# Patient Record
Sex: FEMALE | Race: WHITE | NOT HISPANIC OR LATINO
[De-identification: names, ages, dates, MRNs, and addresses within clinical notes are randomized per-mention and may not be internally consistent; named-entity substitution may affect disease eponyms.]

---

## 2017-10-11 PROBLEM — Z00.00 ENCOUNTER FOR PREVENTIVE HEALTH EXAMINATION: Status: ACTIVE | Noted: 2017-10-11

## 2017-10-12 ENCOUNTER — TRANSCRIPTION ENCOUNTER (OUTPATIENT)
Age: 62
End: 2017-10-12

## 2017-10-24 ENCOUNTER — APPOINTMENT (OUTPATIENT)
Dept: CARDIOLOGY | Facility: CLINIC | Age: 62
End: 2017-10-24
Payer: COMMERCIAL

## 2017-10-24 ENCOUNTER — NON-APPOINTMENT (OUTPATIENT)
Age: 62
End: 2017-10-24

## 2017-10-24 VITALS — HEART RATE: 79 BPM | DIASTOLIC BLOOD PRESSURE: 80 MMHG | WEIGHT: 172 LBS | SYSTOLIC BLOOD PRESSURE: 170 MMHG

## 2017-10-24 DIAGNOSIS — Z82.49 FAMILY HISTORY OF ISCHEMIC HEART DISEASE AND OTHER DISEASES OF THE CIRCULATORY SYSTEM: ICD-10-CM

## 2017-10-24 DIAGNOSIS — R01.1 CARDIAC MURMUR, UNSPECIFIED: ICD-10-CM

## 2017-10-24 DIAGNOSIS — Z83.3 FAMILY HISTORY OF DIABETES MELLITUS: ICD-10-CM

## 2017-10-24 DIAGNOSIS — R00.2 PALPITATIONS: ICD-10-CM

## 2017-10-24 DIAGNOSIS — K21.9 GASTRO-ESOPHAGEAL REFLUX DISEASE W/OUT ESOPHAGITIS: ICD-10-CM

## 2017-10-24 DIAGNOSIS — Z83.511 FAMILY HISTORY OF GLAUCOMA: ICD-10-CM

## 2017-10-24 DIAGNOSIS — Z80.0 FAMILY HISTORY OF MALIGNANT NEOPLASM OF DIGESTIVE ORGANS: ICD-10-CM

## 2017-10-24 PROCEDURE — 93040 RHYTHM ECG WITH REPORT: CPT | Mod: 59

## 2017-10-24 PROCEDURE — 93306 TTE W/DOPPLER COMPLETE: CPT

## 2017-10-24 PROCEDURE — 99204 OFFICE O/P NEW MOD 45 MIN: CPT | Mod: 25

## 2017-10-24 PROCEDURE — 93000 ELECTROCARDIOGRAM COMPLETE: CPT

## 2017-10-26 ENCOUNTER — RESULT CHARGE (OUTPATIENT)
Age: 62
End: 2017-10-26

## 2017-10-27 PROBLEM — R01.1 HEART MURMUR: Status: ACTIVE | Noted: 2017-10-27

## 2017-10-27 PROBLEM — K21.9 GERD (GASTROESOPHAGEAL REFLUX DISEASE): Status: ACTIVE | Noted: 2017-10-27

## 2017-10-27 PROBLEM — R00.2 PALPITATIONS: Status: ACTIVE | Noted: 2017-10-24

## 2017-10-27 PROBLEM — K21.9 LARYNGOPHARYNGEAL REFLUX (LPR): Status: ACTIVE | Noted: 2017-10-27

## 2017-10-28 VITALS — DIASTOLIC BLOOD PRESSURE: 80 MMHG | SYSTOLIC BLOOD PRESSURE: 182 MMHG

## 2017-10-28 PROBLEM — Z82.49 FAMILY HISTORY OF HYPERTENSION: Status: ACTIVE | Noted: 2017-10-28

## 2017-10-28 PROBLEM — Z83.511 FAMILY HISTORY OF GLAUCOMA: Status: ACTIVE | Noted: 2017-10-28

## 2017-10-28 PROBLEM — Z80.0 FAMILY HISTORY OF MALIGNANT NEOPLASM OF STOMACH: Status: ACTIVE | Noted: 2017-10-28

## 2017-10-28 PROBLEM — Z82.49 FAMILY HISTORY OF ACUTE MYOCARDIAL INFARCTION: Status: ACTIVE | Noted: 2017-10-28

## 2017-10-28 PROBLEM — Z82.49 FAMILY HISTORY OF CORONARY ARTERY DISEASE: Status: ACTIVE | Noted: 2017-10-28

## 2017-10-28 PROBLEM — Z83.3 FAMILY HISTORY OF DIABETES MELLITUS: Status: ACTIVE | Noted: 2017-10-28

## 2017-10-28 RX ORDER — LEVOTHYROXINE SODIUM 100 UG/1
100 TABLET ORAL DAILY
Refills: 0 | Status: ACTIVE | COMMUNITY

## 2017-10-28 RX ORDER — VALSARTAN 160 MG/1
160 TABLET, COATED ORAL DAILY
Refills: 0 | Status: ACTIVE | COMMUNITY

## 2017-10-28 RX ORDER — OMEPRAZOLE MAGNESIUM 20 MG/1
20 TABLET, DELAYED RELEASE ORAL DAILY
Refills: 0 | Status: ACTIVE | COMMUNITY

## 2017-10-29 ENCOUNTER — NON-APPOINTMENT (OUTPATIENT)
Age: 62
End: 2017-10-29

## 2017-12-28 ENCOUNTER — APPOINTMENT (OUTPATIENT)
Dept: CARDIOLOGY | Facility: CLINIC | Age: 62
End: 2017-12-28
Payer: COMMERCIAL

## 2017-12-28 DIAGNOSIS — R06.00 DYSPNEA, UNSPECIFIED: ICD-10-CM

## 2017-12-28 DIAGNOSIS — I47.1 SUPRAVENTRICULAR TACHYCARDIA: ICD-10-CM

## 2017-12-28 DIAGNOSIS — R94.39 ABNORMAL RESULT OF OTHER CARDIOVASCULAR FUNCTION STUDY: ICD-10-CM

## 2017-12-28 PROCEDURE — 99214 OFFICE O/P EST MOD 30 MIN: CPT | Mod: 25

## 2017-12-28 PROCEDURE — 93351 STRESS TTE COMPLETE: CPT

## 2018-01-03 ENCOUNTER — APPOINTMENT (OUTPATIENT)
Dept: CARDIOLOGY | Facility: CLINIC | Age: 63
End: 2018-01-03
Payer: COMMERCIAL

## 2018-01-03 PROCEDURE — 93224 XTRNL ECG REC UP TO 48 HRS: CPT

## 2018-01-09 ENCOUNTER — NON-APPOINTMENT (OUTPATIENT)
Age: 63
End: 2018-01-09

## 2018-01-18 ENCOUNTER — NON-APPOINTMENT (OUTPATIENT)
Age: 63
End: 2018-01-18

## 2018-07-09 ENCOUNTER — RESULT REVIEW (OUTPATIENT)
Age: 63
End: 2018-07-09

## 2019-05-19 ENCOUNTER — TRANSCRIPTION ENCOUNTER (OUTPATIENT)
Age: 64
End: 2019-05-19

## 2019-08-05 ENCOUNTER — RESULT REVIEW (OUTPATIENT)
Age: 64
End: 2019-08-05

## 2021-07-04 ENCOUNTER — EMERGENCY (EMERGENCY)
Facility: HOSPITAL | Age: 66
LOS: 1 days | Discharge: HOME | End: 2021-07-04
Attending: EMERGENCY MEDICINE
Payer: MEDICARE

## 2021-07-04 VITALS
HEART RATE: 84 BPM | SYSTOLIC BLOOD PRESSURE: 163 MMHG | OXYGEN SATURATION: 98 % | DIASTOLIC BLOOD PRESSURE: 84 MMHG | TEMPERATURE: 99 F | RESPIRATION RATE: 18 BRPM

## 2021-07-04 VITALS
OXYGEN SATURATION: 99 % | SYSTOLIC BLOOD PRESSURE: 149 MMHG | DIASTOLIC BLOOD PRESSURE: 83 MMHG | RESPIRATION RATE: 18 BRPM | WEIGHT: 166.89 LBS | HEART RATE: 79 BPM | HEIGHT: 67 IN | TEMPERATURE: 99 F

## 2021-07-04 DIAGNOSIS — E89.0 POSTPROCEDURAL HYPOTHYROIDISM: Chronic | ICD-10-CM

## 2021-07-04 LAB
ALBUMIN SERPL ELPH-MCNC: 4.4 G/DL — SIGNIFICANT CHANGE UP (ref 3.3–5)
ALP SERPL-CCNC: 73 U/L — SIGNIFICANT CHANGE UP (ref 40–120)
ALT FLD-CCNC: 16 U/L — SIGNIFICANT CHANGE UP (ref 10–45)
ANION GAP SERPL CALC-SCNC: 14 MMOL/L — SIGNIFICANT CHANGE UP (ref 5–17)
AST SERPL-CCNC: 21 U/L — SIGNIFICANT CHANGE UP (ref 10–40)
BASOPHILS # BLD AUTO: 0.04 K/UL — SIGNIFICANT CHANGE UP (ref 0–0.2)
BASOPHILS NFR BLD AUTO: 0.3 % — SIGNIFICANT CHANGE UP (ref 0–2)
BILIRUB SERPL-MCNC: 0.3 MG/DL — SIGNIFICANT CHANGE UP (ref 0.2–1.2)
BUN SERPL-MCNC: 10 MG/DL — SIGNIFICANT CHANGE UP (ref 7–23)
CALCIUM SERPL-MCNC: 10.1 MG/DL — SIGNIFICANT CHANGE UP (ref 8.4–10.5)
CHLORIDE SERPL-SCNC: 96 MMOL/L — SIGNIFICANT CHANGE UP (ref 96–108)
CO2 SERPL-SCNC: 23 MMOL/L — SIGNIFICANT CHANGE UP (ref 22–31)
CREAT SERPL-MCNC: 0.75 MG/DL — SIGNIFICANT CHANGE UP (ref 0.5–1.3)
EOSINOPHIL # BLD AUTO: 0.01 K/UL — SIGNIFICANT CHANGE UP (ref 0–0.5)
EOSINOPHIL NFR BLD AUTO: 0.1 % — SIGNIFICANT CHANGE UP (ref 0–6)
GLUCOSE SERPL-MCNC: 129 MG/DL — HIGH (ref 70–99)
HCT VFR BLD CALC: 42.1 % — SIGNIFICANT CHANGE UP (ref 34.5–45)
HGB BLD-MCNC: 14 G/DL — SIGNIFICANT CHANGE UP (ref 11.5–15.5)
IMM GRANULOCYTES NFR BLD AUTO: 0.5 % — SIGNIFICANT CHANGE UP (ref 0–1.5)
LIDOCAIN IGE QN: 27 U/L — SIGNIFICANT CHANGE UP (ref 7–60)
LYMPHOCYTES # BLD AUTO: 1.18 K/UL — SIGNIFICANT CHANGE UP (ref 1–3.3)
LYMPHOCYTES # BLD AUTO: 9.7 % — LOW (ref 13–44)
MCHC RBC-ENTMCNC: 30.2 PG — SIGNIFICANT CHANGE UP (ref 27–34)
MCHC RBC-ENTMCNC: 33.3 GM/DL — SIGNIFICANT CHANGE UP (ref 32–36)
MCV RBC AUTO: 90.9 FL — SIGNIFICANT CHANGE UP (ref 80–100)
MONOCYTES # BLD AUTO: 0.44 K/UL — SIGNIFICANT CHANGE UP (ref 0–0.9)
MONOCYTES NFR BLD AUTO: 3.6 % — SIGNIFICANT CHANGE UP (ref 2–14)
NEUTROPHILS # BLD AUTO: 10.39 K/UL — HIGH (ref 1.8–7.4)
NEUTROPHILS NFR BLD AUTO: 85.8 % — HIGH (ref 43–77)
NRBC # BLD: 0 /100 WBCS — SIGNIFICANT CHANGE UP (ref 0–0)
PLATELET # BLD AUTO: 308 K/UL — SIGNIFICANT CHANGE UP (ref 150–400)
POTASSIUM SERPL-MCNC: 3.9 MMOL/L — SIGNIFICANT CHANGE UP (ref 3.5–5.3)
POTASSIUM SERPL-SCNC: 3.9 MMOL/L — SIGNIFICANT CHANGE UP (ref 3.5–5.3)
PROT SERPL-MCNC: 7.7 G/DL — SIGNIFICANT CHANGE UP (ref 6–8.3)
RBC # BLD: 4.63 M/UL — SIGNIFICANT CHANGE UP (ref 3.8–5.2)
RBC # FLD: 12.8 % — SIGNIFICANT CHANGE UP (ref 10.3–14.5)
SODIUM SERPL-SCNC: 133 MMOL/L — LOW (ref 135–145)
TROPONIN T, HIGH SENSITIVITY RESULT: <6 NG/L — SIGNIFICANT CHANGE UP (ref 0–51)
WBC # BLD: 12.12 K/UL — HIGH (ref 3.8–10.5)
WBC # FLD AUTO: 12.12 K/UL — HIGH (ref 3.8–10.5)

## 2021-07-04 PROCEDURE — 71046 X-RAY EXAM CHEST 2 VIEWS: CPT | Mod: 26

## 2021-07-04 PROCEDURE — 84484 ASSAY OF TROPONIN QUANT: CPT

## 2021-07-04 PROCEDURE — 71046 X-RAY EXAM CHEST 2 VIEWS: CPT

## 2021-07-04 PROCEDURE — 80053 COMPREHEN METABOLIC PANEL: CPT

## 2021-07-04 PROCEDURE — 99284 EMERGENCY DEPT VISIT MOD MDM: CPT | Mod: 25

## 2021-07-04 PROCEDURE — 93005 ELECTROCARDIOGRAM TRACING: CPT

## 2021-07-04 PROCEDURE — 99285 EMERGENCY DEPT VISIT HI MDM: CPT | Mod: GC

## 2021-07-04 PROCEDURE — 83690 ASSAY OF LIPASE: CPT

## 2021-07-04 PROCEDURE — 76705 ECHO EXAM OF ABDOMEN: CPT | Mod: 26,RT

## 2021-07-04 PROCEDURE — 76705 ECHO EXAM OF ABDOMEN: CPT

## 2021-07-04 PROCEDURE — 96374 THER/PROPH/DIAG INJ IV PUSH: CPT

## 2021-07-04 PROCEDURE — 85025 COMPLETE CBC W/AUTO DIFF WBC: CPT

## 2021-07-04 PROCEDURE — 93010 ELECTROCARDIOGRAM REPORT: CPT | Mod: GC

## 2021-07-04 RX ORDER — SUCRALFATE 1 G
1 TABLET ORAL ONCE
Refills: 0 | Status: COMPLETED | OUTPATIENT
Start: 2021-07-04 | End: 2021-07-04

## 2021-07-04 RX ORDER — FAMOTIDINE 10 MG/ML
20 INJECTION INTRAVENOUS ONCE
Refills: 0 | Status: COMPLETED | OUTPATIENT
Start: 2021-07-04 | End: 2021-07-04

## 2021-07-04 RX ORDER — ACETAMINOPHEN 500 MG
975 TABLET ORAL ONCE
Refills: 0 | Status: COMPLETED | OUTPATIENT
Start: 2021-07-04 | End: 2021-07-04

## 2021-07-04 RX ADMIN — Medication 1 GRAM(S): at 14:03

## 2021-07-04 RX ADMIN — Medication 975 MILLIGRAM(S): at 14:02

## 2021-07-04 RX ADMIN — FAMOTIDINE 20 MILLIGRAM(S): 10 INJECTION INTRAVENOUS at 14:03

## 2021-07-04 RX ADMIN — Medication 30 MILLILITER(S): at 14:02

## 2021-07-04 NOTE — ED PROVIDER NOTE - PHYSICAL EXAMINATION
GENERAL: elderly female, lying in bed, NAD. Vital signs are within normal limits  EYES: Conjunctiva noninjected or pale, sclera anicteric  HENT: NC/AT, moist mucous membranes  NECK: Supple, trachea midline  LUNG: Nonlabored respirations, no wheezes, rales  CV: RRR, no murmurs, Pulses- Radial/dorsalis pedis: 2+ bilateral and equal  ABDOMEN: Nondistended, + epigastric ttp, - collier, no rebound  MSK: No visible deformities, nontender extremities, no LE edema  SKIN: No rashes, bruises  NEURO: AAOx4 (to person, place, time, event), no tremor, steady gait  PSYCH: Normal mood and affect

## 2021-07-04 NOTE — ED ADULT NURSE NOTE - NS ED NURSE RECORD ANOTHER HT AND WT
Subjective   Patient ID: Deanna is a 30 year old female who presents today for prenatal visit.  She is of 18w0d gestation.  OB History    Para Term  AB Living   1 0 0 0 0 0   SAB TAB Ectopic Molar Multiple Live Births   0 0 0 0 0 0     No fetal movement,    denies any problem  ASSESSMENT:  Pregnancy at 18w0d weeks gestation.  Rhogam candidate.    PLAN:  Follow up visit in 4 weeks   us for anatomy 2 weeks.   declines mss4.   flu vaccine today.   Yes

## 2021-07-04 NOTE — ED ADULT NURSE NOTE - ISOLATION TYPE:
[facility] CARE COORDINATION  [address] [facility] [address]  Phone: [phone]      July 19, 2021      Kristine Scott  [address] [place] [id number]    Dear Kristine,    We have been trying to reach you as you are currently enrolled in [facility] s Care Coordination program.  The goal of care coordination is to help you manage your health and improve access to the [facility] system in the most efficient manner.  The Care Coordination team understands the healthcare system and will assist you in improving your access to care.     We will continue to reach out once more; however, if you are able to call your Care Coordinator that would be appreciated.      We at [facility] are focused on providing you with the highest-quality healthcare experience possible.  It is a pleasure to partner with you as we work towards achieving your optimal state of wellness.        Sincerely,       TIFF Rebollar  Social Work Care Coordinator  [facility]  [email or web address]  [email or web address]    Phone: [phone]      Isadora REYES  Community Health Worker  [facility]  Clinic Care Coordination  [facility]  [email or web address]  [email or web address]   Office: [phone]       None

## 2021-07-04 NOTE — ED PROVIDER NOTE - PATIENT PORTAL LINK FT
You can access the FollowMyHealth Patient Portal offered by [facility] by registering at the following website: [email or web address]. By joining [employer]’s FollowMyHealth portal, you will also be able to view your health information using other applications (apps) compatible with our system.

## 2021-07-04 NOTE — ED PROVIDER NOTE - PROGRESS NOTE DETAILS
Onel Rdz D.O., PGY3 (Resident)  Patient feels improved. Tolerated PO. Results endorsed including gallstones and hepatic steatosis. Return precautions given. Patient expressed verbal understanding.  Will DC with outpatient follow-up. Attending MD Bailon: Patient re-evaluated and feeling improved.  No acute issues at  this time.  Lab and radiology tests reviewed with patient and family.  Tolerating po, eager for discharge.  Patient stable for discharge. Follow up instructions given, importance of follow up emphasized, return to ED parameters reviewed and patient verbalized understanding.  All questions answered, all concerns addressed.

## 2021-07-04 NOTE — ED PROVIDER NOTE - NSFOLLOWUPCLINICS_GEN_ALL_ED_FT
[facility] Specialty Clinics  General Surgery  [address] [place] [address] - 3rd [place] [address]  Phone: [phone]  Fax:   Follow Up Time: 4-6 Days

## 2021-07-04 NOTE — ED ADULT NURSE NOTE - OBJECTIVE STATEMENT
66y old female PMH thyroidectomy and HTN walk in from triage epigastric pain. A&Ox3. Patient states this morning she woke up with epigastric pain w/ radiation under right breast and axilla are, she states it feels like burning and "sticking" pain. She denies chest pain, sob, ha, n/v/d, f/c, urinary symptoms, hematuria. Patient is well appearing, abdomen soft, non-distended, non-tender, bowel sounds present in all 4 quadrants, no pain upon palpation. IV inserted call bell within reach.

## 2021-07-04 NOTE — ED PROVIDER NOTE - ATTENDING CONTRIBUTION TO CARE
Attending MD Bailon: I personally have seen and examined this patient.  Resident note reviewed and agree on plan of care and except where noted.  See below for details.     seen in [facility]    66F with PMH/PSH including HTN, hypothyroidism s/p total thyroidectomy, GERD presents to the ED with epigastric abdominal pain.  Reports at 5am developed burning, gnawing epigastric abdominal pain with radiation to RUQ and R scapular area.  Denies association with po intake, denies positional change, denies exertional.  Reports does not feel like typical GERD, reports usually improves with Tums, Prilosec which it did not.  Denies chest pain, shortness of breath, palpitations. Reports FHx +CAD.  Denies nausea, vomiting, diarrhea, bloody stools, black stools, weight loss.  Denies dysuria, hematuria, change in urinary habits including frequency, urgency. Denies fevers, chills.  Denies EtOH.  A ten (10) point review of systems was negative other than as stated in the HPI or elsewhere in the chart.    Exam:   General: NAD  HENT: head NCAT, airway patent  Eyes: anicteric, no conjunctival injection  Lungs: lungs CTAB with good inspiratory effort, no wheezing, no rhonchi, no rales  Cardiac: +S1S2, no m/r/g  GI: abdomen soft with +BS, +epigastric tenderness, no rebound, ND  : no CVAT  MSK: FROM at neck, no tenderness to midline palpation  Neuro: moving all extremities, no focal deficits  Psych: normal mood and affect   Skin: no rash, no ecchymosis    A/P: 66F with epigastric tenderness, DDx includes gastritis, PUD, gb pathology, pancreatitis, history and clinical picture less consistent with ACS, will obtain labs including lipase, trop, EKG, CXR, US RUQ, GI cocktail, reassess

## 2021-07-04 NOTE — ED ADULT NURSE REASSESSMENT NOTE - NS ED NURSE REASSESS COMMENT FT1
Patient resting comfortably, able to tolerate PO intake w/ no discomfort. MD notified patient requesting information on dietary restrictions.

## 2021-07-04 NOTE — ED PROVIDER NOTE - CLINICAL SUMMARY MEDICAL DECISION MAKING FREE TEXT BOX
66F hx of HTN, hypothyroidism s/p total thyroidectomy, GERD here for epigastric abd pain with radiation to RUQ and right back w/o other associated GI sxs, diff than reflux pain. Vitals wnl. Exam with epigastric ttp, neg collier. Concern for acute fredreic vs pancreatitis. Likely reflux/gastritis. Possible atypical anginal symptoms though patient w/o chest pain or shortness of breath. Check labs, cardiac enzymes, lipase, EKG, CXR, RUQ US, place on cardiac monitor for telemetry monitoring.

## 2021-07-04 NOTE — ED PROVIDER NOTE - OBJECTIVE STATEMENT
66F 66F hx of HTN, hypothyroidism s/p total thyroidectomy, GERD presents to the ED for gnawing epigastric abdominal pain since 5am, with radiating to RUQ and right upper back, no modifying factors, not changed with food, feels diff than GERD. Tried 4 tums, prilosec w/o improvement. + FHx of CAD. Denies chest pain, shortness of breath, fever, chills, nausea, vomiting, diarrhea.

## 2021-07-04 NOTE — ED PROVIDER NOTE - NSFOLLOWUPINSTRUCTIONS_ED_ALL_ED_FT
YOU WERE SEEN IN THE ED FOR: abdominal pain    YOUR US SHOWED GALLSTONES AND HEPATIC STEATOSIS.    AVOID FATTY FOODS.     CONTINUE ALL YOUR HOME MEDICATIONS.    FOR PAIN/FEVER, YOU MAY TAKE TYLENOL (acetaminophen). FOLLOW THE INSTRUCTIONS ON THE LABEL/CONTAINER.    PLEASE FOLLOW UP WITH YOUR PRIVATE PHYSICIAN WITHIN THE NEXT 48 HOURS. BRING COPIES OF YOUR RESULTS.    PLEASE FOLLOW UP WITH GENERAL SURGERY WITHIN 1 WEEK. INFORMATION TO CALL AND MAKE AN APPOINTMENT IS PROVIDED.     RETURN TO THE EMERGENCY DEPARTMENT IF YOU EXPERIENCE ANY NEW/CONCERNING/WORSENING SYMPTOMS.    PLEASE SEE ATTACHED ON GALL STONES AND HEPATIC STEATOSIS.

## 2021-07-04 NOTE — ED ADULT NURSE NOTE - NSIMPLEMENTINTERV_GEN_ALL_ED
Implemented All Universal Safety Interventions:  [place] to call system. Call bell, personal items and telephone within reach. Instruct patient to call for assistance. Room bathroom lighting operational. Non-slip footwear when patient is off stretcher. Physically safe environment: no spills, clutter or unnecessary equipment. Stretcher in lowest position, wheels locked, appropriate side rails in place.

## 2021-07-28 PROBLEM — K21.9 GASTRO-ESOPHAGEAL REFLUX DISEASE WITHOUT ESOPHAGITIS: Chronic | Status: ACTIVE | Noted: 2021-07-04

## 2021-07-28 PROBLEM — I10 ESSENTIAL (PRIMARY) HYPERTENSION: Chronic | Status: ACTIVE | Noted: 2021-07-04

## 2021-08-13 ENCOUNTER — APPOINTMENT (OUTPATIENT)
Dept: SURGERY | Facility: CLINIC | Age: 66
End: 2021-08-13
Payer: MEDICARE

## 2021-08-13 ENCOUNTER — NON-APPOINTMENT (OUTPATIENT)
Age: 66
End: 2021-08-13

## 2021-08-13 VITALS
WEIGHT: 154 LBS | SYSTOLIC BLOOD PRESSURE: 162 MMHG | HEART RATE: 79 BPM | OXYGEN SATURATION: 96 % | TEMPERATURE: 96.1 F | HEIGHT: 67 IN | DIASTOLIC BLOOD PRESSURE: 83 MMHG | BODY MASS INDEX: 24.17 KG/M2 | RESPIRATION RATE: 16 BRPM

## 2021-08-13 DIAGNOSIS — Z85.9 PERSONAL HISTORY OF MALIGNANT NEOPLASM, UNSPECIFIED: ICD-10-CM

## 2021-08-13 PROCEDURE — 99203 OFFICE O/P NEW LOW 30 MIN: CPT

## 2021-08-13 NOTE — CONSULT LETTER
[Dear  ___] : Dear  [unfilled], [Consult Letter:] : I had the pleasure of evaluating your patient, [unfilled]. [Please see my note below.] : Please see my note below. [Consult Closing:] : Thank you very much for allowing me to participate in the care of this patient.  If you have any questions, please do not hesitate to contact me. [Sincerely,] : Sincerely, [DrAshish  ___] : Dr. EDOUARD [DrAshish ___] : Dr. EDOUARD [FreeTextEntry3] : Alex Avalos M.D., F.A.C.S, F.A.S.C.R.S

## 2021-08-13 NOTE — PLAN
[FreeTextEntry1] : In summary the patient had an episode of biliary colic.  I recommended she undergo an elective laparoscopic cholecystectomy.  I explained the risks benefits and alternatives of surgery including the risk of bleeding infection and the possible need for an open procedure and the rare incidence of bile duct injury and chronic diarrhea

## 2021-08-13 NOTE — HISTORY OF PRESENT ILLNESS
[de-identified] : Aure is a 66 year old female here for consultation.\par \par Pt discharged from [facility] following an episode of right upper quadrant pain.  This resolved with conservative treatment.  Liver function tests are normal.\par \par US abdomen RUQ 21- mild hepatic stenosis. cholelithiasis.  No biliary dilation\par \par Pt reports most recent colonoscopy 21- does not have reports, reports normal, with Dr. Roberto Carlos Robin. \par \par Today pt reports feeling well, no pain. Pt reports going to ED  for mid chest pain radiating to RUQ and back that started 7/3 pm. Pt reports 3 formed BMs daily, occasional straining, no pain, no rectal bleeding. Take Metamucil powder 2x daily. Pt reports small appetite, no nausea, no vomiting. Pt not on anticoagulants.  She denies nausea vomiting fevers chills or dark urine.  Her only abdominal surgery was a  section

## 2021-08-13 NOTE — PHYSICAL EXAM
[Normal Breath Sounds] : Normal breath sounds [Normal Heart Sounds] : normal heart sounds [No Rash or Lesion] : No rash or lesion [Alert] : alert [Oriented to Person] : oriented to person [Oriented to Place] : oriented to place [Oriented to Time] : oriented to time [Calm] : calm [No HSM] : no hepatosplenomegaly [JVD] : no jugular venous distention  [Abdominal Masses] : No abdominal masses [Abdomen Tenderness] : ~T ~M No abdominal tenderness [de-identified] : WNL [de-identified] : WNL [de-identified] : ROM WNL

## 2021-08-17 ENCOUNTER — RESULT REVIEW (OUTPATIENT)
Age: 66
End: 2021-08-17

## 2021-09-08 ENCOUNTER — NON-APPOINTMENT (OUTPATIENT)
Age: 66
End: 2021-09-08

## 2021-09-09 ENCOUNTER — APPOINTMENT (OUTPATIENT)
Dept: CARDIOLOGY | Facility: CLINIC | Age: 66
End: 2021-09-09
Payer: MEDICARE

## 2021-09-09 ENCOUNTER — NON-APPOINTMENT (OUTPATIENT)
Age: 66
End: 2021-09-09

## 2021-09-09 VITALS — SYSTOLIC BLOOD PRESSURE: 178 MMHG | OXYGEN SATURATION: 99 % | DIASTOLIC BLOOD PRESSURE: 94 MMHG | HEART RATE: 96 BPM

## 2021-09-09 DIAGNOSIS — I10 ESSENTIAL (PRIMARY) HYPERTENSION: ICD-10-CM

## 2021-09-09 DIAGNOSIS — R07.9 CHEST PAIN, UNSPECIFIED: ICD-10-CM

## 2021-09-09 PROCEDURE — 99204 OFFICE O/P NEW MOD 45 MIN: CPT

## 2021-09-09 PROCEDURE — 93000 ELECTROCARDIOGRAM COMPLETE: CPT

## 2021-09-10 ENCOUNTER — NON-APPOINTMENT (OUTPATIENT)
Age: 66
End: 2021-09-10

## 2021-09-10 ENCOUNTER — APPOINTMENT (OUTPATIENT)
Dept: CARDIOLOGY | Facility: CLINIC | Age: 66
End: 2021-09-10
Payer: MEDICARE

## 2021-09-10 PROCEDURE — 93306 TTE W/DOPPLER COMPLETE: CPT

## 2021-09-14 ENCOUNTER — NON-APPOINTMENT (OUTPATIENT)
Age: 66
End: 2021-09-14

## 2021-09-20 ENCOUNTER — APPOINTMENT (OUTPATIENT)
Dept: CARDIOLOGY | Facility: CLINIC | Age: 66
End: 2021-09-20
Payer: MEDICARE

## 2021-09-20 PROCEDURE — 78452 HT MUSCLE IMAGE SPECT MULT: CPT

## 2021-09-20 PROCEDURE — 93015 CV STRESS TEST SUPVJ I&R: CPT

## 2021-09-20 PROCEDURE — A9500: CPT

## 2021-09-22 DIAGNOSIS — K76.0 FATTY (CHANGE OF) LIVER, NOT ELSEWHERE CLASSIFIED: ICD-10-CM

## 2021-09-22 DIAGNOSIS — Z86.19 PERSONAL HISTORY OF OTHER INFECTIOUS AND PARASITIC DISEASES: ICD-10-CM

## 2021-09-22 PROBLEM — R07.9 CHEST PAIN: Status: ACTIVE | Noted: 2017-10-24

## 2021-09-22 PROBLEM — I10 HYPERTENSION: Status: ACTIVE | Noted: 2017-10-27

## 2021-09-22 RX ORDER — MULTIVIT-MIN/FOLIC/VIT K/LYCOP 400-300MCG
25 MCG TABLET ORAL DAILY
Qty: 90 | Refills: 0 | Status: ACTIVE | COMMUNITY
Start: 2021-09-22

## 2021-09-22 RX ORDER — PSYLLIUM SEED
PACKET (EA) ORAL
Refills: 0 | Status: DISCONTINUED | COMMUNITY
End: 2021-09-22

## 2021-09-22 RX ORDER — ALPRAZOLAM 2 MG/1
TABLET ORAL
Refills: 0 | Status: DISCONTINUED | COMMUNITY
End: 2021-09-22

## 2021-09-22 RX ORDER — CALCIUM CARBONATE/VITAMIN D3 600 MG-20
TABLET ORAL TWICE DAILY
Refills: 0 | Status: ACTIVE | COMMUNITY

## 2021-09-22 RX ORDER — ALPRAZOLAM 0.25 MG/1
0.25 TABLET ORAL
Qty: 90 | Refills: 0 | Status: ACTIVE | COMMUNITY

## 2021-09-22 NOTE — HISTORY OF PRESENT ILLNESS
[FreeTextEntry1] : PCP: Dr. Anand Bone\par She presents for cardiac clearance for laparoscopic cholecystectomy (Dr. Alex Avalos, date to be determined).\par On 7/4/2021, she presented to the ED with 6 hr hx of mid chest/epigastric pain radiating under her right breast to her right upper back and RUQ. /83, 160/82. Troponin wnl. CXR - clear lungs. Abdominal (RUQ) U/S - mild hepatic steatosis, cholelithiasis.\par She has had no recurrence of her pain.\par She denies dyspnea and palpitations.\par Multiple home BP readings 9/4/2021 to 9/9/2021 have been normal (see patient log).\par She walks 90 minutes 3-4X per week.

## 2021-09-23 ENCOUNTER — OUTPATIENT (OUTPATIENT)
Dept: OUTPATIENT SERVICES | Facility: HOSPITAL | Age: 66
LOS: 1 days | End: 2021-09-23
Payer: MEDICARE

## 2021-09-23 VITALS
WEIGHT: 154.98 LBS | HEART RATE: 78 BPM | DIASTOLIC BLOOD PRESSURE: 88 MMHG | OXYGEN SATURATION: 98 % | SYSTOLIC BLOOD PRESSURE: 128 MMHG | HEIGHT: 66.5 IN | TEMPERATURE: 98 F | RESPIRATION RATE: 16 BRPM

## 2021-09-23 DIAGNOSIS — E89.0 POSTPROCEDURAL HYPOTHYROIDISM: Chronic | ICD-10-CM

## 2021-09-23 DIAGNOSIS — Z01.818 ENCOUNTER FOR OTHER PREPROCEDURAL EXAMINATION: ICD-10-CM

## 2021-09-23 DIAGNOSIS — K80.20 CALCULUS OF GALLBLADDER WITHOUT CHOLECYSTITIS WITHOUT OBSTRUCTION: ICD-10-CM

## 2021-09-23 LAB
ALBUMIN SERPL ELPH-MCNC: 4.6 G/DL — SIGNIFICANT CHANGE UP (ref 3.3–5)
ALP SERPL-CCNC: 95 U/L — SIGNIFICANT CHANGE UP (ref 40–120)
ALT FLD-CCNC: 13 U/L — SIGNIFICANT CHANGE UP (ref 10–45)
ANION GAP SERPL CALC-SCNC: 12 MMOL/L — SIGNIFICANT CHANGE UP (ref 5–17)
AST SERPL-CCNC: 26 U/L — SIGNIFICANT CHANGE UP (ref 10–40)
BILIRUB DIRECT SERPL-MCNC: <0.1 MG/DL — SIGNIFICANT CHANGE UP (ref 0–0.2)
BILIRUB INDIRECT FLD-MCNC: >0.4 MG/DL — SIGNIFICANT CHANGE UP (ref 0.2–1)
BILIRUB SERPL-MCNC: 0.5 MG/DL — SIGNIFICANT CHANGE UP (ref 0.2–1.2)
BUN SERPL-MCNC: 10 MG/DL — SIGNIFICANT CHANGE UP (ref 7–23)
CALCIUM SERPL-MCNC: 9.9 MG/DL — SIGNIFICANT CHANGE UP (ref 8.4–10.5)
CHLORIDE SERPL-SCNC: 101 MMOL/L — SIGNIFICANT CHANGE UP (ref 96–108)
CO2 SERPL-SCNC: 26 MMOL/L — SIGNIFICANT CHANGE UP (ref 22–31)
CREAT SERPL-MCNC: 0.81 MG/DL — SIGNIFICANT CHANGE UP (ref 0.5–1.3)
GLUCOSE SERPL-MCNC: 94 MG/DL — SIGNIFICANT CHANGE UP (ref 70–99)
HCT VFR BLD CALC: 45.5 % — HIGH (ref 34.5–45)
HGB BLD-MCNC: 14.8 G/DL — SIGNIFICANT CHANGE UP (ref 11.5–15.5)
MCHC RBC-ENTMCNC: 30 PG — SIGNIFICANT CHANGE UP (ref 27–34)
MCHC RBC-ENTMCNC: 32.5 GM/DL — SIGNIFICANT CHANGE UP (ref 32–36)
MCV RBC AUTO: 92.3 FL — SIGNIFICANT CHANGE UP (ref 80–100)
NRBC # BLD: 0 /100 WBCS — SIGNIFICANT CHANGE UP (ref 0–0)
PLATELET # BLD AUTO: 333 K/UL — SIGNIFICANT CHANGE UP (ref 150–400)
POTASSIUM SERPL-MCNC: 4.2 MMOL/L — SIGNIFICANT CHANGE UP (ref 3.5–5.3)
POTASSIUM SERPL-SCNC: 4.2 MMOL/L — SIGNIFICANT CHANGE UP (ref 3.5–5.3)
PROT SERPL-MCNC: 7.9 G/DL — SIGNIFICANT CHANGE UP (ref 6–8.3)
RBC # BLD: 4.93 M/UL — SIGNIFICANT CHANGE UP (ref 3.8–5.2)
RBC # FLD: 12.8 % — SIGNIFICANT CHANGE UP (ref 10.3–14.5)
SODIUM SERPL-SCNC: 139 MMOL/L — SIGNIFICANT CHANGE UP (ref 135–145)
WBC # BLD: 9.31 K/UL — SIGNIFICANT CHANGE UP (ref 3.8–10.5)
WBC # FLD AUTO: 9.31 K/UL — SIGNIFICANT CHANGE UP (ref 3.8–10.5)

## 2021-09-23 PROCEDURE — G0463: CPT

## 2021-09-23 RX ORDER — LIDOCAINE HCL 20 MG/ML
0.2 VIAL (ML) INJECTION ONCE
Refills: 0 | Status: DISCONTINUED | OUTPATIENT
Start: 2021-09-30 | End: 2021-10-14

## 2021-09-23 RX ORDER — SODIUM CHLORIDE 9 MG/ML
3 INJECTION INTRAMUSCULAR; INTRAVENOUS; SUBCUTANEOUS EVERY 8 HOURS
Refills: 0 | Status: DISCONTINUED | OUTPATIENT
Start: 2021-09-30 | End: 2021-10-14

## 2021-09-23 NOTE — H&P PST ADULT - NSICDXPASTSURGICALHX_GEN_ALL_CORE_FT
PAST SURGICAL HISTORY:  H/O total thyroidectomy      PAST SURGICAL HISTORY:  H/O total thyroidectomy 1985

## 2021-09-23 NOTE — H&P PST ADULT - HEIGHT IN CM
Continue to give motrin/ibuprofen for pain, keep  hydrated and give cold foods/drinks to help soothe the throat.  Follow up with pediatrician in 2-3 days.  Return to the ER if any signs of dehydration such as decreased wet diapers or lethargy.  Continue amoxicillin as prescribed by your pediatrician. 168.91

## 2021-09-23 NOTE — H&P PST ADULT - PROBLEM SELECTOR PLAN 1
Labs  Pre Op education discussed.  Teach back performed. Scheduled for a Laparoscopy Cholecystectomy on 09/30/2021 with Dr Avalos.  Labs  Pre Op education discussed.  Teach back performed.

## 2021-09-23 NOTE — H&P PST ADULT - NSICDXPASTMEDICALHX_GEN_ALL_CORE_FT
PAST MEDICAL HISTORY:  GERD (gastroesophageal reflux disease)     HTN (hypertension)     Hypothyroid      PAST MEDICAL HISTORY:  GERD (gastroesophageal reflux disease)     HTN (hypertension)

## 2021-09-23 NOTE — H&P PST ADULT - NEGATIVE RESPIRATORY AND THORAX SYMPTOMS
"Subjective:       Patient ID: Doug Waite is a 16 y.o. female.    Vitals:  height is 5' 4" (1.626 m) and weight is 54.4 kg (120 lb). Her oral temperature is 98 °F (36.7 °C). Her blood pressure is 110/72 and her pulse is 86. Her respiration is 16 and oxygen saturation is 98%.     Chief Complaint: Nasal Congestion    HPI    Constitution: Positive for chills and fever. Negative for sweating and fatigue.   HENT: Positive for congestion and postnasal drip. Negative for ear pain, sinus pain, sinus pressure, sore throat and voice change.    Neck: negative. Negative for painful lymph nodes.   Cardiovascular: Negative.    Eyes: Negative for eye redness.   Respiratory: Negative for chest tightness, cough, sputum production, bloody sputum, COPD, shortness of breath, stridor, wheezing and asthma.    Gastrointestinal: Negative.  Negative for nausea and vomiting.   Endocrine: negative.   Genitourinary: Negative.    Musculoskeletal: Negative.  Negative for muscle ache.   Skin: Negative for rash.   Allergic/Immunologic: Negative for seasonal allergies and asthma.   Hematologic/Lymphatic: Negative for swollen lymph nodes.   Psychiatric/Behavioral: Negative.        Objective:      Physical Exam   Constitutional: She is oriented to person, place, and time. She appears well-developed and well-nourished. She is cooperative.  Non-toxic appearance. She does not appear ill. No distress.   HENT:   Head: Normocephalic and atraumatic.   Right Ear: Hearing, tympanic membrane, external ear and ear canal normal.   Left Ear: Hearing, tympanic membrane, external ear and ear canal normal.   Nose: Nose normal. No mucosal edema, rhinorrhea or nasal deformity. No epistaxis. Right sinus exhibits no maxillary sinus tenderness and no frontal sinus tenderness. Left sinus exhibits no maxillary sinus tenderness and no frontal sinus tenderness.   Mouth/Throat: Uvula is midline, oropharynx is clear and moist and mucous membranes are normal. No trismus in " the jaw. Normal dentition. No uvula swelling. No posterior oropharyngeal erythema.   Eyes: Conjunctivae, EOM and lids are normal. Pupils are equal, round, and reactive to light. No scleral icterus.   Sclera clear bilat   Neck: Trachea normal, normal range of motion, full passive range of motion without pain and phonation normal. Neck supple.   Cardiovascular: Normal rate, regular rhythm, normal heart sounds, intact distal pulses and normal pulses.   Pulmonary/Chest: Effort normal and breath sounds normal. No respiratory distress.   Abdominal: Soft. Normal appearance and bowel sounds are normal. She exhibits no distension. There is no tenderness.   Musculoskeletal: Normal range of motion. She exhibits no edema or deformity.   Neurological: She is alert and oriented to person, place, and time. She exhibits normal muscle tone. Coordination normal.   Skin: Skin is warm, dry and intact. Capillary refill takes less than 2 seconds. She is not diaphoretic. No pallor.   Psychiatric: She has a normal mood and affect. Her speech is normal and behavior is normal. Judgment and thought content normal. Cognition and memory are normal.   Nursing note and vitals reviewed.      Assessment:       1. Nasal congestion    2. Influenza        Plan:     Vitals stable. No hypoxia or respiratory distress. Influenza negative. Given symptoms and influenza prevalence will treat for presumed influenza.     Nasal congestion  -     POCT Influenza A/B  -     dexamethasone injection 8 mg  -     oseltamivir (TAMIFLU) 75 MG capsule; Take 1 capsule (75 mg total) by mouth 2 (two) times daily. for 5 days  Dispense: 10 capsule; Refill: 0    Influenza  -     oseltamivir (TAMIFLU) 75 MG capsule; Take 1 capsule (75 mg total) by mouth 2 (two) times daily. for 5 days  Dispense: 10 capsule; Refill: 0          no wheezing

## 2021-09-23 NOTE — H&P PST ADULT - HISTORY OF PRESENT ILLNESS
65 y/o female with hx of hypothyroidism, HTN, GERD  67 y/o female with hx of HTN, GERD , s/p total thyroidectomy (1985)  c/o mid chest pain radiating under her right breast to  RUQ and her right upper back since July 3rd 2021. U/S (RUQ) revealed - mild hepatic steatosis, cholelithiasis.    Scheduled for a Laparoscopy Cholecystectomy on 09/30/2021 with Dr Avalos.    Denies Recent travel, Exposure or Covid symptoms  Covid test on 09/27/2021

## 2021-09-27 ENCOUNTER — OUTPATIENT (OUTPATIENT)
Dept: OUTPATIENT SERVICES | Facility: HOSPITAL | Age: 66
LOS: 1 days | End: 2021-09-27
Payer: MEDICARE

## 2021-09-27 DIAGNOSIS — Z11.52 ENCOUNTER FOR SCREENING FOR COVID-19: ICD-10-CM

## 2021-09-27 DIAGNOSIS — E89.0 POSTPROCEDURAL HYPOTHYROIDISM: Chronic | ICD-10-CM

## 2021-09-27 LAB — SARS-COV-2 RNA SPEC QL NAA+PROBE: SIGNIFICANT CHANGE UP

## 2021-09-27 PROCEDURE — U0003: CPT

## 2021-09-27 PROCEDURE — U0005: CPT

## 2021-09-27 PROCEDURE — C9803: CPT

## 2021-09-29 ENCOUNTER — TRANSCRIPTION ENCOUNTER (OUTPATIENT)
Age: 66
End: 2021-09-29

## 2021-09-30 ENCOUNTER — OUTPATIENT (OUTPATIENT)
Dept: OUTPATIENT SERVICES | Facility: HOSPITAL | Age: 66
LOS: 1 days | End: 2021-09-30
Payer: MEDICARE

## 2021-09-30 ENCOUNTER — APPOINTMENT (OUTPATIENT)
Dept: SURGERY | Facility: HOSPITAL | Age: 66
End: 2021-09-30
Payer: MEDICARE

## 2021-09-30 ENCOUNTER — RESULT REVIEW (OUTPATIENT)
Age: 66
End: 2021-09-30

## 2021-09-30 VITALS
DIASTOLIC BLOOD PRESSURE: 63 MMHG | RESPIRATION RATE: 16 BRPM | SYSTOLIC BLOOD PRESSURE: 120 MMHG | OXYGEN SATURATION: 97 % | HEART RATE: 91 BPM

## 2021-09-30 VITALS
TEMPERATURE: 98 F | OXYGEN SATURATION: 100 % | HEIGHT: 66.5 IN | HEART RATE: 81 BPM | WEIGHT: 154.98 LBS | DIASTOLIC BLOOD PRESSURE: 83 MMHG | SYSTOLIC BLOOD PRESSURE: 151 MMHG | RESPIRATION RATE: 16 BRPM

## 2021-09-30 DIAGNOSIS — Z01.818 ENCOUNTER FOR OTHER PREPROCEDURAL EXAMINATION: ICD-10-CM

## 2021-09-30 DIAGNOSIS — E89.0 POSTPROCEDURAL HYPOTHYROIDISM: Chronic | ICD-10-CM

## 2021-09-30 DIAGNOSIS — K80.20 CALCULUS OF GALLBLADDER WITHOUT CHOLECYSTITIS WITHOUT OBSTRUCTION: ICD-10-CM

## 2021-09-30 PROCEDURE — 88304 TISSUE EXAM BY PATHOLOGIST: CPT | Mod: 26

## 2021-09-30 PROCEDURE — 47562 LAPAROSCOPIC CHOLECYSTECTOMY: CPT

## 2021-09-30 PROCEDURE — C1889: CPT

## 2021-09-30 PROCEDURE — C9399: CPT

## 2021-09-30 PROCEDURE — 88304 TISSUE EXAM BY PATHOLOGIST: CPT

## 2021-09-30 RX ORDER — ACETAMINOPHEN 500 MG
650 TABLET ORAL EVERY 6 HOURS
Refills: 0 | Status: DISCONTINUED | OUTPATIENT
Start: 2021-09-30 | End: 2021-10-14

## 2021-09-30 RX ORDER — OMEPRAZOLE 10 MG/1
1 CAPSULE, DELAYED RELEASE ORAL
Qty: 0 | Refills: 0 | DISCHARGE

## 2021-09-30 RX ORDER — OXYCODONE HYDROCHLORIDE 5 MG/1
5 TABLET ORAL ONCE
Refills: 0 | Status: DISCONTINUED | OUTPATIENT
Start: 2021-09-30 | End: 2021-09-30

## 2021-09-30 RX ORDER — CALCIUM CARBONATE 500(1250)
2 TABLET ORAL
Qty: 0 | Refills: 0 | DISCHARGE

## 2021-09-30 RX ORDER — LEVOTHYROXINE SODIUM 125 MCG
1 TABLET ORAL
Qty: 0 | Refills: 0 | DISCHARGE

## 2021-09-30 RX ORDER — ACETAMINOPHEN 500 MG
2 TABLET ORAL
Qty: 0 | Refills: 0 | DISCHARGE
Start: 2021-09-30

## 2021-09-30 RX ORDER — IBUPROFEN 200 MG
1 TABLET ORAL
Qty: 0 | Refills: 0 | DISCHARGE
Start: 2021-09-30

## 2021-09-30 RX ORDER — IBUPROFEN 200 MG
400 TABLET ORAL EVERY 6 HOURS
Refills: 0 | Status: DISCONTINUED | OUTPATIENT
Start: 2021-09-30 | End: 2021-09-30

## 2021-09-30 RX ORDER — APREPITANT 80 MG/1
40 CAPSULE ORAL ONCE
Refills: 0 | Status: COMPLETED | OUTPATIENT
Start: 2021-09-30 | End: 2021-09-30

## 2021-09-30 RX ORDER — OXYCODONE HYDROCHLORIDE 5 MG/1
5 TABLET ORAL EVERY 6 HOURS
Refills: 0 | Status: DISCONTINUED | OUTPATIENT
Start: 2021-09-30 | End: 2021-09-30

## 2021-09-30 RX ORDER — OXYCODONE HYDROCHLORIDE 5 MG/1
1 TABLET ORAL
Qty: 10 | Refills: 0
Start: 2021-09-30

## 2021-09-30 RX ORDER — CEFOTETAN DISODIUM 1 G
2 VIAL (EA) INJECTION ONCE
Refills: 0 | Status: COMPLETED | OUTPATIENT
Start: 2021-09-30 | End: 2021-09-30

## 2021-09-30 RX ORDER — ALPRAZOLAM 0.25 MG
1 TABLET ORAL
Qty: 0 | Refills: 0 | DISCHARGE

## 2021-09-30 RX ORDER — VALSARTAN 80 MG/1
1 TABLET ORAL
Qty: 0 | Refills: 0 | DISCHARGE

## 2021-09-30 RX ORDER — OXYCODONE HYDROCHLORIDE 5 MG/1
1 TABLET ORAL
Qty: 0 | Refills: 0 | DISCHARGE
Start: 2021-09-30

## 2021-09-30 RX ORDER — SODIUM CHLORIDE 9 MG/ML
1000 INJECTION, SOLUTION INTRAVENOUS
Refills: 0 | Status: DISCONTINUED | OUTPATIENT
Start: 2021-09-30 | End: 2021-10-14

## 2021-09-30 RX ORDER — KETOROLAC TROMETHAMINE 30 MG/ML
30 SYRINGE (ML) INJECTION ONCE
Refills: 0 | Status: DISCONTINUED | OUTPATIENT
Start: 2021-09-30 | End: 2021-10-14

## 2021-09-30 RX ORDER — CHLORHEXIDINE GLUCONATE 213 G/1000ML
1 SOLUTION TOPICAL ONCE
Refills: 0 | Status: COMPLETED | OUTPATIENT
Start: 2021-09-30 | End: 2021-09-30

## 2021-09-30 RX ORDER — CHOLECALCIFEROL (VITAMIN D3) 125 MCG
1 CAPSULE ORAL
Qty: 0 | Refills: 0 | DISCHARGE

## 2021-09-30 RX ORDER — ONDANSETRON 8 MG/1
4 TABLET, FILM COATED ORAL ONCE
Refills: 0 | Status: DISCONTINUED | OUTPATIENT
Start: 2021-09-30 | End: 2021-10-14

## 2021-09-30 RX ORDER — HYDROMORPHONE HYDROCHLORIDE 2 MG/ML
0.25 INJECTION INTRAMUSCULAR; INTRAVENOUS; SUBCUTANEOUS ONCE
Refills: 0 | Status: DISCONTINUED | OUTPATIENT
Start: 2021-09-30 | End: 2021-10-14

## 2021-09-30 RX ADMIN — CHLORHEXIDINE GLUCONATE 1 APPLICATION(S): 213 SOLUTION TOPICAL at 12:11

## 2021-09-30 RX ADMIN — APREPITANT 40 MILLIGRAM(S): 80 CAPSULE ORAL at 12:11

## 2021-09-30 NOTE — ASU DISCHARGE PLAN (ADULT/PEDIATRIC) - CARE PROVIDER_API CALL
Alex Avalos)  ColonRectal Surgery; Surgery  [address] [facility], [address]  [place] [address]  Phone: [phone]  Fax: [phone]  Follow Up Time: 2 weeks

## 2021-09-30 NOTE — BRIEF OPERATIVE NOTE - NSICDXBRIEFPROCEDURE_GEN_ALL_CORE_FT
PROCEDURES:  Cholecystectomy, laparoscopic, using multiple ports 30-Sep-2021 17:13:24  Rafita Ying

## 2021-10-07 LAB — SURGICAL PATHOLOGY STUDY: SIGNIFICANT CHANGE UP

## 2021-10-14 PROBLEM — K80.20 CALCULUS OF GALLBLADDER WITHOUT CHOLECYSTITIS WITHOUT OBSTRUCTION: Status: RESOLVED | Noted: 2021-08-13 | Resolved: 2021-10-14

## 2021-10-14 PROBLEM — E03.9 HYPOTHYROIDISM: Status: ACTIVE | Noted: 2021-10-14

## 2021-10-15 ENCOUNTER — APPOINTMENT (OUTPATIENT)
Dept: SURGERY | Facility: CLINIC | Age: 66
End: 2021-10-15
Payer: MEDICARE

## 2021-10-15 VITALS
RESPIRATION RATE: 16 BRPM | OXYGEN SATURATION: 97 % | DIASTOLIC BLOOD PRESSURE: 79 MMHG | TEMPERATURE: 97.6 F | SYSTOLIC BLOOD PRESSURE: 165 MMHG | HEART RATE: 77 BPM

## 2021-10-15 DIAGNOSIS — K80.20 CALCULUS OF GALLBLADDER W/OUT CHOLECYSTITIS W/OUT OBSTRUCTION: ICD-10-CM

## 2021-10-15 DIAGNOSIS — Z09 ENCOUNTER FOR FOLLOW-UP EXAMINATION AFTER COMPLETED TREATMENT FOR CONDITIONS OTHER THAN MALIGNANT NEOPLASM: ICD-10-CM

## 2021-10-15 DIAGNOSIS — E03.9 HYPOTHYROIDISM, UNSPECIFIED: ICD-10-CM

## 2021-10-15 PROCEDURE — 99024 POSTOP FOLLOW-UP VISIT: CPT

## 2021-10-15 NOTE — ASSESSMENT
[FreeTextEntry1] : In summary the patient doing well status post laparoscopic cholecystectomy.  I instructed her that she may resume her normal diet and activities as tolerated.  From my standpoint I only need to see her as needed.

## 2021-10-15 NOTE — HISTORY OF PRESENT ILLNESS
[de-identified] : Aure is a 67 y/o female here for a first post op visit following a laparoscopic cholecystectomy on 9/30/21. Pathology: chronic cholecystitis and cholelithiasis.\par \par Today pt reports feeling well, no pain. Pt reports no BM for 3-4 days post op, took Miralax, Colace, Metamucil with relief. Currently BMs daily/every other day. Episode of diarrhea 10/15/21. Good appetite, no nausea, no vomiting.

## 2021-10-15 NOTE — CONSULT LETTER
[Dear  ___] : Dear  [unfilled], [Consult Letter:] : I had the pleasure of evaluating your patient, [unfilled]. [Please see my note below.] : Please see my note below. [Consult Closing:] : Thank you very much for allowing me to participate in the care of this patient.  If you have any questions, please do not hesitate to contact me. [Sincerely,] : Sincerely, [FreeTextEntry3] : Alex Avalos M.D., F.A.C.S, F.A.S.C.R.S [DrAshish  ___] : Dr. EDOUARD [DrAshish ___] : Dr. EDOUARD

## 2022-11-30 ENCOUNTER — APPOINTMENT (OUTPATIENT)
Dept: ORTHOPEDIC SURGERY | Facility: CLINIC | Age: 67
End: 2022-11-30

## 2022-11-30 VITALS — WEIGHT: 154 LBS | BODY MASS INDEX: 24.17 KG/M2 | HEIGHT: 67 IN

## 2022-11-30 DIAGNOSIS — M76.51 PATELLAR TENDINITIS, RIGHT KNEE: ICD-10-CM

## 2022-11-30 DIAGNOSIS — M22.42 CHONDROMALACIA PATELLAE, LEFT KNEE: ICD-10-CM

## 2022-11-30 DIAGNOSIS — M17.0 BILATERAL PRIMARY OSTEOARTHRITIS OF KNEE: ICD-10-CM

## 2022-11-30 PROCEDURE — 99203 OFFICE O/P NEW LOW 30 MIN: CPT

## 2022-11-30 PROCEDURE — 73564 X-RAY EXAM KNEE 4 OR MORE: CPT | Mod: 50

## 2022-11-30 NOTE — PHYSICAL EXAM
[Bilateral] : knee bilaterally [NL (0)] : extension 0 degrees [5___] : hamstring 5[unfilled]/5 [Negative] : negative Antwan's [] : negative Lachmann [TWNoteComboBox7] : flexion 115 degrees

## 2022-11-30 NOTE — HISTORY OF PRESENT ILLNESS
[Gradual] : gradual [4] : 4 [2] : 2 [Dull/Aching] : dull/aching [Localized] : localized [Sharp] : sharp [Throbbing] : throbbing [Intermittent] : intermittent [Household chores] : household chores [Leisure] : leisure [Sleep] : sleep [Social interactions] : social interactions [Nothing helps with pain getting better] : Nothing helps with pain getting better [Walking] : walking [Exercising] : exercising [Retired] : Work status: retired [de-identified] : 11-30-22 she is known by history to walk 90 min at a time three times per week. Back in October while doing well one day decided to increase her pace. While at the increased pace developed pain over the right anterior knee into the patellar tendon and tibial tubericle distribution. She also notes pain medially on the right knee.\par as for the left knee some pain in tibial tubericle region but more sp supra patellar and with start up from chair or down stairs. she has tried aleve with minimal help.  [] : Patient is currently injured and not playing sports: no [FreeTextEntry5] : 11/30/2022 :REJI LIZANDRO , a 67 year old female, presents today for bilateral knee pain, injured while power walking since October

## 2022-11-30 NOTE — IMAGING
[Bilateral] : knee bilaterally [AP] : anteroposterior [Lateral] : lateral [[place]] : [place]yline [AP Standing] : anteroposterior standing [FreeTextEntry9] : mod medial and pf narrowing

## 2022-11-30 NOTE — ASSESSMENT
[FreeTextEntry1] : will start her on therapy for both knees\par discussed the role of NSAIDs possible csi and or ha injections\par Apply ice to affected area.\par Activity modifier as tolerated.\par Questions addressed.\par \par The documentation recorded by the scribe accurately reflects the service I personally performed and the decisions made by me.\par I, Abel Veraibe, attest that this documentation has been prepared under the direction and in the presence of Provider Hilton Aquino MD.\par \par The patient was seen by Hilton Aquino MD.\par

## 2022-11-30 NOTE — REASON FOR VISIT
[FreeTextEntry2] : 11/30/2022 :REJI LIZANDRO , a 67 year old female, presents today for bilateral knee pain, injured while power walking since October \par

## 2023-09-14 NOTE — H&P PST ADULT - NS NEC GEN PE MLT EXAM PC
Dr. Rachna Madsen (nephrologist - kidney Dr. ) stated to follow up with Rheumatologist.     Please provide patient with instructions for tapering amiodarone   400 mg twice daily x1 week then   200 mg twice daily x1 week then   200 mg daily detailed exam

## 2023-10-09 ENCOUNTER — APPOINTMENT (OUTPATIENT)
Dept: OBGYN | Facility: CLINIC | Age: 68
End: 2023-10-09
Payer: MEDICARE

## 2023-10-09 PROCEDURE — 82270 OCCULT BLOOD FECES: CPT

## 2023-10-09 PROCEDURE — 81002 URINALYSIS NONAUTO W/O SCOPE: CPT

## 2023-10-09 PROCEDURE — G0101: CPT

## 2023-10-09 PROCEDURE — 76830 TRANSVAGINAL US NON-OB: CPT

## 2023-10-09 PROCEDURE — 99213 OFFICE O/P EST LOW 20 MIN: CPT | Mod: 25

## 2024-11-06 ENCOUNTER — APPOINTMENT (OUTPATIENT)
Dept: ORTHOPEDIC SURGERY | Facility: CLINIC | Age: 69
End: 2024-11-06
Payer: MEDICARE

## 2024-11-06 VITALS — BODY MASS INDEX: 25.11 KG/M2 | WEIGHT: 160 LBS | HEIGHT: 67 IN

## 2024-11-06 DIAGNOSIS — M77.02 MEDIAL EPICONDYLITIS, LEFT ELBOW: ICD-10-CM

## 2024-11-06 DIAGNOSIS — M77.12 LATERAL EPICONDYLITIS, LEFT ELBOW: ICD-10-CM

## 2024-11-06 DIAGNOSIS — M77.11 LATERAL EPICONDYLITIS, RIGHT ELBOW: ICD-10-CM

## 2024-11-06 PROCEDURE — 99214 OFFICE O/P EST MOD 30 MIN: CPT

## 2024-11-06 PROCEDURE — 73080 X-RAY EXAM OF ELBOW: CPT | Mod: LT

## 2024-11-06 RX ORDER — DICLOFENAC SODIUM 10 MG/G
1 GEL TOPICAL TWICE DAILY
Qty: 1 | Refills: 3 | Status: ACTIVE | COMMUNITY
Start: 2024-11-06 | End: 1900-01-01

## 2024-11-07 ENCOUNTER — APPOINTMENT (OUTPATIENT)
Dept: MRI IMAGING | Facility: CLINIC | Age: 69
End: 2024-11-07

## 2024-11-11 ENCOUNTER — NON-APPOINTMENT (OUTPATIENT)
Age: 69
End: 2024-11-11

## 2024-11-12 ENCOUNTER — APPOINTMENT (OUTPATIENT)
Dept: MRI IMAGING | Facility: CLINIC | Age: 69
End: 2024-11-12

## 2024-11-22 ENCOUNTER — APPOINTMENT (OUTPATIENT)
Dept: ORTHOPEDIC SURGERY | Facility: CLINIC | Age: 69
End: 2024-11-22
Payer: MEDICARE

## 2024-11-22 DIAGNOSIS — M89.9 DISORDER OF BONE, UNSPECIFIED: ICD-10-CM

## 2024-11-22 PROCEDURE — 99204 OFFICE O/P NEW MOD 45 MIN: CPT

## 2024-12-24 ENCOUNTER — APPOINTMENT (OUTPATIENT)
Dept: ORTHOPEDIC SURGERY | Facility: CLINIC | Age: 69
End: 2024-12-24

## 2024-12-24 PROCEDURE — 99441: CPT | Mod: 93

## 2025-04-10 ENCOUNTER — APPOINTMENT (OUTPATIENT)
Dept: ORTHOPEDIC SURGERY | Facility: CLINIC | Age: 70
End: 2025-04-10
Payer: MEDICARE

## 2025-04-10 VITALS — HEIGHT: 67 IN | BODY MASS INDEX: 27.47 KG/M2 | WEIGHT: 175 LBS

## 2025-04-10 DIAGNOSIS — M17.12 UNILATERAL PRIMARY OSTEOARTHRITIS, LEFT KNEE: ICD-10-CM

## 2025-04-10 DIAGNOSIS — Z86.79 PERSONAL HISTORY OF OTHER DISEASES OF THE CIRCULATORY SYSTEM: ICD-10-CM

## 2025-04-10 DIAGNOSIS — M17.11 UNILATERAL PRIMARY OSTEOARTHRITIS, RIGHT KNEE: ICD-10-CM

## 2025-04-10 PROCEDURE — J3490M: CUSTOM | Mod: NC,JZ

## 2025-04-10 PROCEDURE — 73564 X-RAY EXAM KNEE 4 OR MORE: CPT | Mod: 50

## 2025-04-10 PROCEDURE — 99214 OFFICE O/P EST MOD 30 MIN: CPT | Mod: 25

## 2025-04-10 PROCEDURE — 20610 DRAIN/INJ JOINT/BURSA W/O US: CPT | Mod: LT

## 2025-04-10 RX ORDER — MELOXICAM 7.5 MG/1
7.5 TABLET ORAL
Qty: 30 | Refills: 1 | Status: ACTIVE | COMMUNITY
Start: 2025-04-10 | End: 1900-01-01